# Patient Record
Sex: MALE | Race: BLACK OR AFRICAN AMERICAN | NOT HISPANIC OR LATINO | Employment: FULL TIME | URBAN - METROPOLITAN AREA
[De-identification: names, ages, dates, MRNs, and addresses within clinical notes are randomized per-mention and may not be internally consistent; named-entity substitution may affect disease eponyms.]

---

## 2024-04-18 ENCOUNTER — OFFICE VISIT (OUTPATIENT)
Dept: INTERNAL MEDICINE | Facility: CLINIC | Age: 59
End: 2024-04-18
Payer: COMMERCIAL

## 2024-04-18 VITALS
HEART RATE: 65 BPM | BODY MASS INDEX: 44.1 KG/M2 | HEIGHT: 71 IN | DIASTOLIC BLOOD PRESSURE: 90 MMHG | OXYGEN SATURATION: 97 % | WEIGHT: 315 LBS | SYSTOLIC BLOOD PRESSURE: 150 MMHG

## 2024-04-18 DIAGNOSIS — I45.4 BBB (BUNDLE BRANCH BLOCK): ICD-10-CM

## 2024-04-18 DIAGNOSIS — Z76.89 ENCOUNTER TO ESTABLISH CARE: Primary | ICD-10-CM

## 2024-04-18 DIAGNOSIS — E55.9 VITAMIN D DEFICIENCY: ICD-10-CM

## 2024-04-18 DIAGNOSIS — G47.33 OSA (OBSTRUCTIVE SLEEP APNEA): ICD-10-CM

## 2024-04-18 DIAGNOSIS — R01.1 MURMUR, CARDIAC: ICD-10-CM

## 2024-04-18 DIAGNOSIS — I10 HYPERTENSION, UNSPECIFIED TYPE: ICD-10-CM

## 2024-04-18 DIAGNOSIS — Z76.89 ENCOUNTER FOR SKIN CARE: ICD-10-CM

## 2024-04-18 PROCEDURE — 99204 OFFICE O/P NEW MOD 45 MIN: CPT | Performed by: NURSE PRACTITIONER

## 2024-04-18 RX ORDER — AMLODIPINE BESYLATE 5 MG/1
5 TABLET ORAL DAILY
Qty: 30 TABLET | Refills: 1 | Status: SHIPPED | OUTPATIENT
Start: 2024-04-18

## 2024-04-18 RX ORDER — ASPIRIN 81 MG/1
81 TABLET ORAL DAILY
COMMUNITY

## 2024-04-18 RX ORDER — ERGOCALCIFEROL (VITAMIN D2) 10 MCG
400 TABLET ORAL DAILY
COMMUNITY

## 2024-04-18 NOTE — PROGRESS NOTES
Office Note     Name: Joey Cody    : 1965     MRN: [id number]     Chief Complaint  Establish Care    Subjective     History of Present Illness:  Joey Cody is a 58 y.o. male who presents today to establish care with a new provider.  Patient reports he recently moved back to [place] from [place].  [facility]  He works for [employer] and relocates every few months.  Past medical medication history reviewed with the patient.  Patient is overweight, has hypertension, is borderline diabetic, has vitamin D deficiency, and has obstructive sleep apnea.  He had a CPAP machine but reports his machine is since been recalled.  He does not currently take any medications for high blood pressure or diabetes.  He had been more active until the winter months starting in November.  He reports picking up about 10 pounds recently due to back pain.  He had a cardiology evaluation back in  and was told that he has a left bundle branch block.  Colonoscopy was completed in 2023.  He reports this report was good and that he will repeat in 5 years.  No further complaints or concerns at this time.  Pleasant visit with the patient today.        History reviewed. No pertinent past medical history.    History reviewed. No pertinent surgical history.    Social History     Socioeconomic History    Marital status:    Tobacco Use    Smoking status: Never    Smokeless tobacco: Never   Vaping Use    Vaping status: Never Used   Substance and Sexual Activity    Alcohol use: Never    Drug use: Never    Sexual activity: Defer     Partners: Female         Current Outpatient Medications:     aspirin 81 MG EC tablet, Take 1 tablet by mouth Daily., Disp: , Rfl:     Vitamin D, Cholecalciferol, (CHOLECALCIFEROL) 10 MCG (400 UNIT) tablet, Take 1 tablet by mouth Daily., Disp: , Rfl:     amLODIPine (NORVASC) 5 MG tablet, Take 1 tablet by mouth Daily., Disp: 30  "tablet, Rfl: 1    Objective     Vital Signs  /90 (BP Location: Left arm, Patient Position: Sitting, Cuff Size: Adult)   Pulse 65   Ht 180.3 cm (71\")   Wt (!) 159 kg (350 lb)   SpO2 97%   BMI 48.82 kg/m²   Estimated body mass index is 48.82 kg/m² as calculated from the following:    Height as of this encounter: 180.3 cm (71\").    Weight as of this encounter: 159 kg (350 lb).    Class 3 Severe Obesity (BMI >=40). Obesity-related health conditions include the following: hypertension. Obesity is unchanged. BMI is is above average; BMI management plan is completed. We discussed portion control and increasing exercise.      Physical Exam  Constitutional:       Appearance: Normal appearance.   HENT:      Head: Normocephalic and atraumatic.      Nose: Nose normal.   Eyes:      Extraocular Movements: Extraocular movements intact.      Conjunctiva/sclera: Conjunctivae normal.      Pupils: Pupils are equal, round, and reactive to light.   Cardiovascular:      Rate and Rhythm: Normal rate and regular rhythm.      Heart sounds: Murmur heard.   Pulmonary:      Effort: Pulmonary effort is normal. No respiratory distress.      Breath sounds: Normal breath sounds.   Musculoskeletal:         General: Normal range of motion.      Cervical back: Normal range of motion and neck supple.   Skin:     General: Skin is warm and dry.   Neurological:      General: No focal deficit present.      Mental Status: He is alert and oriented to person, place, and time. Mental status is at baseline.   Psychiatric:         Mood and Affect: Mood normal.         Behavior: Behavior normal.         Thought Content: Thought content normal.         Judgment: Judgment normal.          Assessment and Plan     Diagnoses and all orders for this visit:    1. Encounter to establish care (Primary)    2. Encounter for skin care  -     Ambulatory Referral to Dermatology    3. BBB (bundle branch block)  -     Ambulatory Referral to Cardiology    4. Murmur, " cardiac  -     Ambulatory Referral to Cardiology    5. Hypertension, unspecified type  -     Ambulatory Referral to Cardiology  -     amLODIPine (NORVASC) 5 MG tablet; Take 1 tablet by mouth Daily.  Dispense: 30 tablet; Refill: 1    6. DIEUDONNE (obstructive sleep apnea)    7. Vitamin D deficiency    Plan:  Referral placed to cardiology for further evaluation of bundle branch block and murmur.  Referral placed to dermatology for routine skin screening.  Start amlodipine 5 mg once daily.  Low-sodium diet.  Return to clinic in 4 weeks for follow-up.    Follow Up  Return in about 4 weeks (around 5/16/2024) for Recheck.    JERMAIN Davila    Part of this note may be an electronic transcription/translation of spoken language to printed text using the Dragon Dictation System.

## 2024-04-20 ENCOUNTER — PATIENT ROUNDING (BHMG ONLY) (OUTPATIENT)
Dept: INTERNAL MEDICINE | Facility: CLINIC | Age: 59
End: 2024-04-20
Payer: COMMERCIAL

## 2024-04-20 NOTE — PROGRESS NOTES
Thank you for choosing [facility] for your Primary Care Provider.      My name is Taisha Macias.  I am the practice manager at [facility].    I want to officially welcome you to our practice and ask about your recent visit.    If you could tell me about our recent visit with us.  What things went well?    We are always looking for ways to make our patents' experiences even better. Do you have any recommendations on ways that we may improve?    Overall, were you satisfied with your first visit to our practice?    I appreciate you taking the time to answer a few questions today.      Thank you and have a great day!    Taisha

## 2024-04-30 PROBLEM — E55.9 VITAMIN D DEFICIENCY: Status: ACTIVE | Noted: 2024-04-30

## 2024-04-30 PROBLEM — I45.4 BBB (BUNDLE BRANCH BLOCK): Status: ACTIVE | Noted: 2024-04-30

## 2024-04-30 PROBLEM — I10 HYPERTENSION: Status: ACTIVE | Noted: 2024-04-30

## 2024-04-30 PROBLEM — G47.33 OSA (OBSTRUCTIVE SLEEP APNEA): Status: ACTIVE | Noted: 2024-04-30

## 2024-04-30 PROBLEM — R01.1 MURMUR, CARDIAC: Status: ACTIVE | Noted: 2024-04-30

## 2024-05-01 ENCOUNTER — OFFICE VISIT (OUTPATIENT)
Dept: CARDIOLOGY | Facility: CLINIC | Age: 59
End: 2024-05-01
Payer: COMMERCIAL

## 2024-05-01 VITALS
HEIGHT: 71 IN | BODY MASS INDEX: 44.1 KG/M2 | OXYGEN SATURATION: 97 % | HEART RATE: 57 BPM | DIASTOLIC BLOOD PRESSURE: 70 MMHG | SYSTOLIC BLOOD PRESSURE: 110 MMHG | WEIGHT: 315 LBS

## 2024-05-01 DIAGNOSIS — I10 PRIMARY HYPERTENSION: ICD-10-CM

## 2024-05-01 DIAGNOSIS — E66.01 CLASS 3 SEVERE OBESITY DUE TO EXCESS CALORIES WITHOUT SERIOUS COMORBIDITY WITH BODY MASS INDEX (BMI) OF 45.0 TO 49.9 IN ADULT: ICD-10-CM

## 2024-05-01 DIAGNOSIS — I45.4 BBB (BUNDLE BRANCH BLOCK): ICD-10-CM

## 2024-05-01 DIAGNOSIS — R01.1 MURMUR, CARDIAC: Primary | ICD-10-CM

## 2024-05-01 PROCEDURE — 99203 OFFICE O/P NEW LOW 30 MIN: CPT | Performed by: INTERNAL MEDICINE

## 2024-05-01 PROCEDURE — 93000 ELECTROCARDIOGRAM COMPLETE: CPT | Performed by: INTERNAL MEDICINE

## 2024-05-01 NOTE — PROGRESS NOTES
[facility] CARDIOLOGY    New Patient Office Visit    Patient Name: Joey Cody  : 1965   MRN: [id number]   Care Team: Patient Care Team:  Diya Zhao APRN as PCP - General (Internal Medicine)  Jessica, Chinedu Morton MD as Cardiologist (Cardiology)    Chief Complaint   Patient presents with    Heart Murmur    Hypertension     HPI: Joey Cody is a 58 y.o. male with a history of hypertension, left bundle branch block, obesity, sleep apnea who presents today to establish care with cardiology locally.  He reports having been diagnosed with a left bundle branch block in  and has had a few visits with a cardiologist in [place].  He underwent CT coronary in  which showed no significant disease and has never had a stress test or invasive evaluation.  He denies any chest pain or dyspnea on exertion was referred to cardiology locally but has plans to return to [place] in the next few months.  He does report having leg swelling which has been present for some time.  He was recently started on amlodipine which has improved his blood pressure and he does not think has had a significant impact on his leg swelling.    He works for [employer] and spends a lot of time walking with his day-to-day activity.  Currently, he gets about 10,000 steps per day but is increasing that with walking on his off time to try and lose weight.  He does report having some dyspnea when doing multiple flights of stairs but not with usual activity and no anginal chest pain.    Subjective   Review of Systems   Constitutional:  Negative for activity change.   Respiratory:  Negative for chest tightness and shortness of breath.    Cardiovascular:  Positive for leg swelling. Negative for chest pain and palpitations.       Past Medical History:   Diagnosis Date    Abnormal ECG 10/10/2017    left bundle branch block    Arrhythmia     not sure    Heart murmur     not sure, recent visit before referral doc said heard a murmur     "Hypertension 2017    taking meds for first time    Sleep apnea 2017    have not used CPAP machine in a year     History reviewed. No pertinent surgical history.    Social History     Socioeconomic History    Marital status:    Tobacco Use    Smoking status: Never    Smokeless tobacco: Never   Vaping Use    Vaping status: Never Used    Passive vaping exposure: Yes   Substance and Sexual Activity    Alcohol use: Never    Drug use: Never    Sexual activity: Not Currently     Partners: Male     Family History   Problem Relation Age of Onset    Hypertension Mother         Takes Meds    No Known Problems Father        Current Outpatient Medications:     amLODIPine (NORVASC) 5 MG tablet, Take 1 tablet by mouth Daily., Disp: 30 tablet, Rfl: 1    aspirin 81 MG EC tablet, Take 1 tablet by mouth Daily., Disp: , Rfl:     Vitamin D, Cholecalciferol, (CHOLECALCIFEROL) 10 MCG (400 UNIT) tablet, Take 1 tablet by mouth Daily., Disp: , Rfl:     No Known Allergies    Objective     Vitals:    05/01/24 1310   BP: 110/70   BP Location: Right arm   Patient Position: Sitting   Pulse: 57   SpO2: 97%   Weight: (!) 156 kg (345 lb)   Height: 180.3 cm (71\")   Body mass index is 48.12 kg/m².  Gen: well developed, sitting up on exam table, comfortable appearing  HEENT: MMM, sclera anicteric, conjunctiva normal, no carotid bruits  CV: regular rate, regular rhythm, II/VI DASIA at RUSB, normal S1, S2. 2+ radial and PT pulses  Pulm: RA, normal work of breathing, no wheezes, rales, rhonchi  Ext: normal bulk for age, normal tone, 1+ bilateral dependent edema  Neuro: alert, oriented, face symmetrical, moving all extremities well  Psych: normal mood, appropriate affect    Most recent PCP note, imaging tests, and labs reviewed.    Labs:    Lab Results   Component Value Date    BUN 8 (L) 01/06/2022    CREATININE 1.20 02/15/2022    EGFRIFNONA 63 02/15/2022    EGFRIFAFRI 76 02/15/2022     Lab Results   Component Value Date    CHLPL 178 01/06/2022    " TRIG 213 (H) 01/06/2022    HDL 35 (L) 01/06/2022     01/06/2022       ECG 12 Lead    Date/Time: 5/1/2024 2:23 PM  Performed by: Chinedu Lopez MD    Authorized by: Chinedu Lopez MD  Comparison: not compared with previous ECG   Previous ECG: no previous ECG available  Rhythm: sinus bradycardia  Rate: bradycardic  BPM: 57  Conduction: left bundle branch block  QRS axis: left    Clinical impression: abnormal EKG          2/15/2022 - CT Coronary  1.  Normal coronary artery origins with minimal non-obstructive coronary artery disease, CADRADS 1-Consider non- atherosclerotic causes of chest pain. Consider preventive therapy and risk factor modification   2.  Minimal atherosclerotic plaque burden with total Agatston Coronary calcium score 3 placing patient below 25th percentile for age and gender   3.  Mild left atrial dilation without intra-cardiac thrombus or intracardiac shunt     9/21/2020 - TTE ([facility])   -  Normal left ventricular size.   -  Mild concentric left ventricular hypertrophy.   -  The visually estimated  ejection fraction  is 50%.   -  Diastolic function is normal.   -  The right ventricular  systolic pressure is normal.     Assessment & Plan       ICD-10-CM ICD-9-CM   1. Murmur, cardiac  R01.1 785.2   2. BBB (bundle branch block)  I45.4 426.50   3. Primary hypertension  I10 401.9   4. Class 3 severe obesity due to excess calories without serious comorbidity with body mass index (BMI) of 45.0 to 49.9 in adult  E66.01 278.01    Z68.42 V85.42        Chronic LBBB  Systolic murmur  Leg swelling   - TTE ordered    Hypertension   - Controlled today, if leg swelling becomes more of an issue would consider alternative agent   - Continue amlodipine for now    Return if symptoms worsen or fail to improve.    IBIS Lopez MD  05/01/24    [facility]  [address]  [address] [place] [id number] [phone]

## 2024-05-14 ENCOUNTER — OFFICE VISIT (OUTPATIENT)
Dept: INTERNAL MEDICINE | Facility: CLINIC | Age: 59
End: 2024-05-14
Payer: COMMERCIAL

## 2024-05-14 ENCOUNTER — LAB (OUTPATIENT)
Dept: LAB | Facility: HOSPITAL | Age: 59
End: 2024-05-14
Payer: COMMERCIAL

## 2024-05-14 VITALS
HEART RATE: 50 BPM | HEIGHT: 71 IN | TEMPERATURE: 96.7 F | SYSTOLIC BLOOD PRESSURE: 128 MMHG | RESPIRATION RATE: 18 BRPM | WEIGHT: 315 LBS | OXYGEN SATURATION: 98 % | DIASTOLIC BLOOD PRESSURE: 86 MMHG | BODY MASS INDEX: 44.1 KG/M2

## 2024-05-14 DIAGNOSIS — G47.33 OSA (OBSTRUCTIVE SLEEP APNEA): ICD-10-CM

## 2024-05-14 DIAGNOSIS — I10 HYPERTENSION, UNSPECIFIED TYPE: ICD-10-CM

## 2024-05-14 DIAGNOSIS — Z00.00 ANNUAL PHYSICAL EXAM: Primary | ICD-10-CM

## 2024-05-14 DIAGNOSIS — I45.4 BBB (BUNDLE BRANCH BLOCK): ICD-10-CM

## 2024-05-14 DIAGNOSIS — Z13.1 SCREENING FOR DIABETES MELLITUS (DM): ICD-10-CM

## 2024-05-14 DIAGNOSIS — E55.9 VITAMIN D DEFICIENCY: ICD-10-CM

## 2024-05-14 DIAGNOSIS — R01.1 MURMUR, CARDIAC: ICD-10-CM

## 2024-05-14 DIAGNOSIS — Z13.29 SCREENING FOR HYPOTHYROIDISM: ICD-10-CM

## 2024-05-14 DIAGNOSIS — Z12.5 SCREENING FOR PROSTATE CANCER: ICD-10-CM

## 2024-05-14 DIAGNOSIS — Z13.220 SCREENING FOR HYPERLIPIDEMIA: ICD-10-CM

## 2024-05-14 LAB — HBA1C MFR BLD: 6.5 % (ref 4.8–5.6)

## 2024-05-14 PROCEDURE — 90750 HZV VACC RECOMBINANT IM: CPT | Performed by: NURSE PRACTITIONER

## 2024-05-14 PROCEDURE — 36415 COLL VENOUS BLD VENIPUNCTURE: CPT | Performed by: NURSE PRACTITIONER

## 2024-05-14 PROCEDURE — G0103 PSA SCREENING: HCPCS | Performed by: NURSE PRACTITIONER

## 2024-05-14 PROCEDURE — 82306 VITAMIN D 25 HYDROXY: CPT | Performed by: NURSE PRACTITIONER

## 2024-05-14 PROCEDURE — 80061 LIPID PANEL: CPT | Performed by: NURSE PRACTITIONER

## 2024-05-14 PROCEDURE — 83036 HEMOGLOBIN GLYCOSYLATED A1C: CPT | Performed by: NURSE PRACTITIONER

## 2024-05-14 PROCEDURE — 80050 GENERAL HEALTH PANEL: CPT | Performed by: NURSE PRACTITIONER

## 2024-05-14 PROCEDURE — 99396 PREV VISIT EST AGE 40-64: CPT | Performed by: NURSE PRACTITIONER

## 2024-05-14 PROCEDURE — 90471 IMMUNIZATION ADMIN: CPT | Performed by: NURSE PRACTITIONER

## 2024-05-14 NOTE — PROGRESS NOTES
Office Note     Name: Joey Cody    : 1965     MRN: [id number]     Chief Complaint  Hypertension (Possible physical?)    Subjective     History of Present Illness:  Joey Cody is a 58 y.o. male who presents today for annual physical exam.    Hypertension: Blood pressure better controlled with starting amlodipine 5 mg daily.  He is tolerating this medication well without side effects.  Will continue with the current dose for now.  Patient is being evaluated by cardiology.    Cardiac murmur and bundle branch block: Cardiology evaluation in progress.  Awaiting echo.    The patient is being seen for a health maintenance evaluation.    Past Medical History:   Diagnosis Date    Abnormal ECG 10/10/2017    left bundle branch block    Arrhythmia     not sure    Heart murmur 2017    not sure, recent visit before referral doc said heard a murmur    Hypertension 2017    taking meds for first time    Low back pain [id number]    Last year gotton worse    Obesity 2020    Sleep apnea 2017    have not used CPAP machine in a year       Past Surgical History:   Procedure Laterality Date    COLONOSCOPY  [id number]    5 year no findings       Social History     Socioeconomic History    Marital status:    Tobacco Use    Smoking status: Never    Smokeless tobacco: Never   Vaping Use    Vaping status: Never Used    Passive vaping exposure: Yes   Substance and Sexual Activity    Alcohol use: Never    Drug use: Never    Sexual activity: Not Currently     Partners: Male         Current Outpatient Medications:     amLODIPine (NORVASC) 5 MG tablet, Take 1 tablet by mouth Daily., Disp: 30 tablet, Rfl: 1    aspirin 81 MG EC tablet, Take 1 tablet by mouth Daily., Disp: , Rfl:     Vitamin D, Cholecalciferol, (CHOLECALCIFEROL) 10 MCG (400 UNIT) tablet, Take 1 tablet by mouth Daily., Disp: , Rfl:     General History  Joey  does not have regular dental visits.  He does complain of vision problems. Last eye exam was 1 month  "ago.  Immunizations are not up to date. The patient needs the following immunizations: Shingles, hep B, and Tdap.  Agreeable to shingles vaccine today.    Lifestyle  Joey  consumes a  an average diet, could be better. Trying to eat more chicken and veggies .  He exercises intermittently.    Reproductive Health  Joey  is not sexually active. His contraceptive plan is no method.   He does not have erectile dysfunction.     Screening  Last PSA was 1-2 years ago.  Last prostate exam was 1-2 years ago. Family history of prostate cancer: No FH.  Last testicular exam was self.   Last colonoscopy was 10/23. Repeat in 5 years.  Last Completed Colonoscopy            COLORECTAL CANCER SCREENING (COLONOSCOPY - Every 10 Years) Next due on 10/9/2033      10/09/2023  COLONOSCOPY (Patient-Reported (Performed Externally))                . Family history of colon cancer: No FH.  Other pertinent family history and/or screenings: None at this time.    Review of Systems   Skin:  Positive for color change and rash.       Objective     Vital Signs  /86   Pulse 50   Temp 96.7 °F (35.9 °C) (Temporal)   Resp 18   Ht 180.3 cm (71\")   Wt (!) 160 kg (352 lb)   SpO2 98%   BMI 49.09 kg/m²   Estimated body mass index is 49.09 kg/m² as calculated from the following:    Height as of this encounter: 180.3 cm (71\").    Weight as of this encounter: 160 kg (352 lb).            Physical Exam  Constitutional:       General: He is awake. He is not in acute distress.     Appearance: Normal appearance. He is well-developed and well-groomed. He is obese. He is not ill-appearing.   HENT:      Head: Normocephalic and atraumatic.      Right Ear: Tympanic membrane, ear canal and external ear normal.      Left Ear: Tympanic membrane, ear canal and external ear normal.      Nose: Nose normal.      Mouth/Throat:      Mouth: Mucous membranes are moist.      Pharynx: Oropharynx is clear.   Eyes:      General: No scleral icterus.     Extraocular " Movements: Extraocular movements intact.      Conjunctiva/sclera: Conjunctivae normal.      Pupils: Pupils are equal, round, and reactive to light.      Comments: Glasses in place   Neck:      Trachea: Trachea normal.   Cardiovascular:      Rate and Rhythm: Normal rate and regular rhythm.      Pulses: Normal pulses.      Heart sounds: Murmur heard.   Pulmonary:      Effort: Pulmonary effort is normal. No tachypnea, accessory muscle usage or respiratory distress.      Breath sounds: Normal breath sounds. No wheezing, rhonchi or rales.   Abdominal:      General: Abdomen is flat. Bowel sounds are normal. There is no distension.      Palpations: Abdomen is soft.      Tenderness: There is no abdominal tenderness.   Musculoskeletal:         General: No swelling. Normal range of motion.      Cervical back: Normal range of motion and neck supple. No tenderness.      Right lower leg: No edema.      Left lower leg: No edema.   Skin:     General: Skin is warm and dry.      Capillary Refill: Capillary refill takes less than 2 seconds.      Coloration: Skin is not jaundiced or pale.      Findings: No lesion or rash.      Comments: Dryness and discoloration noted to left lateral lower leg   Neurological:      General: No focal deficit present.      Mental Status: He is alert and oriented to person, place, and time. Mental status is at baseline.      Motor: No weakness.      Gait: Gait is intact.   Psychiatric:         Attention and Perception: Attention normal.         Mood and Affect: Mood normal.         Speech: Speech normal.         Behavior: Behavior normal. Behavior is cooperative.         Thought Content: Thought content normal.         Judgment: Judgment normal.          Assessment and Plan     Diagnoses and all orders for this visit:    1. Annual physical exam (Primary)  -     CBC (No Diff)  -     Comprehensive metabolic panel  -     TSH Rfx On Abnormal To Free T4  -     Lipid Panel  -     PSA SCREENING  -     Hemoglobin  A1c  -     Vitamin D,25-Hydroxy    2. Screening for hyperlipidemia  -     Lipid Panel    3. Screening for hypothyroidism  -     TSH Rfx On Abnormal To Free T4    4. Screening for diabetes mellitus (DM)  -     Hemoglobin A1c    5. Screening for prostate cancer  -     PSA SCREENING    6. BBB (bundle branch block)    7. Hypertension, unspecified type    8. Murmur, cardiac    9. DIEUDONNE (obstructive sleep apnea)    10. Vitamin D deficiency  -     Vitamin D,25-Hydroxy        Plan:  Annual physical exam.  Orders placed for fasting labs.  Will review lab results with patient once received and reviewed.  Further plan of care based on lab result findings.  Continue with cardiology workup.  Will do first shingles injection today.  Dermatology referral in progress.  Continue to maintain up to date immunizations and health maintenance screenings.  Continue with healthy lifestyle choices such as healthy diet and eating habits and regular exercise routine.  Continue with adequate oral hydration and rest.  Will hold on a follow-up visit at this time as the patient is staying in [place] temporarily for work.  He is unsure if he will be here through September or not.  Will have him follow-up as needed for now.    Follow Up  Return if symptoms worsen or fail to improve.    JERMAIN Davila    Part of this note may be an electronic transcription/translation of spoken language to printed text using the Dragon Dictation System.  Answers submitted by the patient for this visit:  Primary Reason for Visit (Submitted on 5/12/2024)  What is the primary reason for your visit?: Other  Other (Submitted on 5/12/2024)  Please describe your symptoms.: This is annual physical and follow up on BP medication issued month ago... still concerned about sore on leg i wonder if an insect bite slow healing being months  Have you had these symptoms before?: Yes  How long have you been having these symptoms?: Greater than 2 weeks  Please list any medications  you are currently taking for this condition.: nothing new since last visit

## 2024-05-15 LAB
25(OH)D3 SERPL-MCNC: 20.9 NG/ML (ref 30–100)
ALBUMIN SERPL-MCNC: 4.5 G/DL (ref 3.5–5.2)
ALBUMIN/GLOB SERPL: 1.3 G/DL
ALP SERPL-CCNC: 60 U/L (ref 39–117)
ALT SERPL W P-5'-P-CCNC: 17 U/L (ref 1–41)
ANION GAP SERPL CALCULATED.3IONS-SCNC: 11 MMOL/L (ref 5–15)
AST SERPL-CCNC: 19 U/L (ref 1–40)
BILIRUB SERPL-MCNC: 0.4 MG/DL (ref 0–1.2)
BUN SERPL-MCNC: 9 MG/DL (ref 6–20)
BUN/CREAT SERPL: 7.8 (ref 7–25)
CALCIUM SPEC-SCNC: 9.7 MG/DL (ref 8.6–10.5)
CHLORIDE SERPL-SCNC: 100 MMOL/L (ref 98–107)
CHOLEST SERPL-MCNC: 161 MG/DL (ref 0–200)
CO2 SERPL-SCNC: 28 MMOL/L (ref 22–29)
CREAT SERPL-MCNC: 1.15 MG/DL (ref 0.76–1.27)
DEPRECATED RDW RBC AUTO: 42.3 FL (ref 37–54)
EGFRCR SERPLBLD CKD-EPI 2021: 74.7 ML/MIN/1.73
ERYTHROCYTE [DISTWIDTH] IN BLOOD BY AUTOMATED COUNT: 14 % (ref 12.3–15.4)
GLOBULIN UR ELPH-MCNC: 3.4 GM/DL
GLUCOSE SERPL-MCNC: 71 MG/DL (ref 65–99)
HCT VFR BLD AUTO: 45.7 % (ref 37.5–51)
HDLC SERPL-MCNC: 37 MG/DL (ref 40–60)
HGB BLD-MCNC: 15.2 G/DL (ref 13–17.7)
LDLC SERPL CALC-MCNC: 95 MG/DL (ref 0–100)
LDLC/HDLC SERPL: 2.47 {RATIO}
MCH RBC QN AUTO: 28.1 PG (ref 26.6–33)
MCHC RBC AUTO-ENTMCNC: 33.3 G/DL (ref 31.5–35.7)
MCV RBC AUTO: 84.6 FL (ref 79–97)
PLATELET # BLD AUTO: 251 10*3/MM3 (ref 140–450)
PMV BLD AUTO: 11 FL (ref 6–12)
POTASSIUM SERPL-SCNC: 3.9 MMOL/L (ref 3.5–5.2)
PROT SERPL-MCNC: 7.9 G/DL (ref 6–8.5)
PSA SERPL-MCNC: 2.23 NG/ML (ref 0–4)
RBC # BLD AUTO: 5.4 10*6/MM3 (ref 4.14–5.8)
SODIUM SERPL-SCNC: 139 MMOL/L (ref 136–145)
TRIGL SERPL-MCNC: 163 MG/DL (ref 0–150)
TSH SERPL DL<=0.05 MIU/L-ACNC: 1.65 UIU/ML (ref 0.27–4.2)
VLDLC SERPL-MCNC: 29 MG/DL (ref 5–40)
WBC NRBC COR # BLD AUTO: 6.42 10*3/MM3 (ref 3.4–10.8)

## 2024-07-01 DIAGNOSIS — I10 HYPERTENSION, UNSPECIFIED TYPE: ICD-10-CM

## 2024-07-01 RX ORDER — AMLODIPINE BESYLATE 5 MG/1
5 TABLET ORAL DAILY
Qty: 90 TABLET | Refills: 1 | Status: SHIPPED | OUTPATIENT
Start: 2024-07-01